# Patient Record
Sex: FEMALE
[De-identification: names, ages, dates, MRNs, and addresses within clinical notes are randomized per-mention and may not be internally consistent; named-entity substitution may affect disease eponyms.]

---

## 2022-09-24 ENCOUNTER — NURSE TRIAGE (OUTPATIENT)
Dept: OTHER | Facility: CLINIC | Age: 67
End: 2022-09-24

## 2022-09-24 NOTE — TELEPHONE ENCOUNTER
Subjective: Caller states \"I was in the ED a few days ago, I broke my left ankle and sprained my right ankle. I can't even stand to go to the bathroom. What should I do?\"     Patient denies any new or worsening symptoms but is concerned with being home alone. Writer recommended patient call her PCP and ask to be admitted into the hospital until she can get care set up at home. Patient was agreeable to this plan. She was told to call back with any worsening symptoms or further questions.    Reason for Disposition   Caller has already spoken with the PCP (or office), and has no further questions    Protocols used: No Contact or Duplicate Contact Call-ADULT-[place]